# Patient Record
Sex: FEMALE | Race: WHITE | NOT HISPANIC OR LATINO | Employment: UNEMPLOYED
[De-identification: names, ages, dates, MRNs, and addresses within clinical notes are randomized per-mention and may not be internally consistent; named-entity substitution may affect disease eponyms.]

---

## 2020-08-31 ENCOUNTER — OFFICE VISIT (OUTPATIENT)
Age: 70
End: 2020-08-31

## 2021-01-12 ENCOUNTER — OFFICE VISIT (OUTPATIENT)
Age: 71
End: 2021-01-12

## 2021-02-09 ENCOUNTER — OFFICE VISIT (OUTPATIENT)
Age: 71
End: 2021-02-09

## 2021-11-30 ENCOUNTER — OFFICE VISIT (OUTPATIENT)
Age: 71
End: 2021-11-30

## 2021-12-21 ENCOUNTER — OFFICE VISIT (OUTPATIENT)
Dept: URBAN - METROPOLITAN AREA CLINIC 68 | Facility: CLINIC | Age: 71
End: 2021-12-21

## 2022-01-06 ENCOUNTER — OFFICE VISIT (OUTPATIENT)
Dept: URBAN - METROPOLITAN AREA SURGERY CENTER 12 | Facility: SURGERY CENTER | Age: 72
End: 2022-01-06

## 2022-01-06 ENCOUNTER — TELEPHONE ENCOUNTER (OUTPATIENT)
Dept: URBAN - METROPOLITAN AREA CLINIC 68 | Facility: CLINIC | Age: 72
End: 2022-01-06

## 2022-06-01 ENCOUNTER — TELEPHONE ENCOUNTER (OUTPATIENT)
Dept: URBAN - METROPOLITAN AREA CLINIC 68 | Facility: CLINIC | Age: 72
End: 2022-06-01

## 2022-06-04 ENCOUNTER — TELEPHONE ENCOUNTER (OUTPATIENT)
Dept: URBAN - METROPOLITAN AREA CLINIC 68 | Facility: CLINIC | Age: 72
End: 2022-06-04

## 2022-06-04 RX ORDER — PANTOPRAZOLE SODIUM 40 MG/1
TABLET, DELAYED RELEASE ORAL DAILY
Qty: 1 | Refills: 0 | OUTPATIENT
Start: 2021-12-21 | End: 2021-12-22

## 2022-06-04 RX ORDER — OMEPRAZOLE 20 MG/1
CAPSULE, DELAYED RELEASE ORAL DAILY
Qty: 90 | Refills: 0 | OUTPATIENT
Start: 2018-12-07 | End: 2019-03-07

## 2022-06-04 RX ORDER — GABAPENTIN 300 MG/1
GABAPENTIN( 300MG ORAL  TWO TIMES DAILY ) INACTIVE -HX ENTRY CAPSULE ORAL
OUTPATIENT
Start: 2020-01-16

## 2022-06-04 RX ORDER — SODIUM SULFATE, POTASSIUM SULFATE, MAGNESIUM SULFATE 17.5; 3.13; 1.6 G/ML; G/ML; G/ML
SOLUTION, CONCENTRATE ORAL AS DIRECTED
Qty: 1 | Refills: 0 | OUTPATIENT
Start: 2018-05-24 | End: 2018-05-25

## 2022-06-05 ENCOUNTER — TELEPHONE ENCOUNTER (OUTPATIENT)
Dept: URBAN - METROPOLITAN AREA CLINIC 68 | Facility: CLINIC | Age: 72
End: 2022-06-05

## 2022-06-05 RX ORDER — NAPROXEN 375 MG/1
NAPROXEN( 375MG ORAL   ) ACTIVE -HX ENTRY TABLET ORAL
Status: ACTIVE | COMMUNITY
Start: 2021-12-21

## 2022-06-05 RX ORDER — METHOTREXATE 2.5 MG/1
METHOTREXATE( 2.5MG ORAL   ) ACTIVE -HX ENTRY TABLET ORAL
Status: ACTIVE | COMMUNITY
Start: 2021-12-21

## 2022-06-05 RX ORDER — CHOLECALCIFEROL (VITAMIN D3) 125 MCG
VITAMIN D3( 5000UNIT ORAL  DAILY ) ACTIVE -HX ENTRY CAPSULE ORAL DAILY
Status: ACTIVE | COMMUNITY
Start: 2020-01-16

## 2022-06-05 RX ORDER — ATORVASTATIN CALCIUM 10 MG/1
ATORVASTATIN CALCIUM( 10MG ORAL   ) ACTIVE -HX ENTRY TABLET, FILM COATED ORAL
Status: ACTIVE | COMMUNITY
Start: 2021-12-21

## 2022-06-05 RX ORDER — HYDROXYCHLOROQUINE SULFATE 200 MG/1
HYDROXYCHLOROQUINE SULFATE( 200MG ORAL   ) ACTIVE -HX ENTRY TABLET, FILM COATED ORAL
Status: ACTIVE | COMMUNITY
Start: 2021-12-21

## 2022-06-05 RX ORDER — GABAPENTIN 100 MG/1
GABAPENTIN( 100MG ORAL   ) ACTIVE -HX ENTRY CAPSULE ORAL
Status: ACTIVE | COMMUNITY
Start: 2021-12-21

## 2022-06-05 RX ORDER — ALENDRONATE SODIUM 35 MG/1
ALENDRONATE SODIUM( 35MG ORAL   ) ACTIVE -HX ENTRY TABLET ORAL
Status: ACTIVE | COMMUNITY
Start: 2021-12-21

## 2022-06-05 RX ORDER — HYDROCHLOROTHIAZIDE 12.5 MG/1
HYDROCHLOROTHIAZIDE( 12.5MG ORAL  DAILY ) ACTIVE -HX ENTRY CAPSULE ORAL DAILY
Status: ACTIVE | COMMUNITY
Start: 2020-01-16

## 2022-06-05 RX ORDER — ATORVASTATIN CALCIUM 10 MG/1
ATORVASTATIN CALCIUM( 10MG ORAL  DAILY ) ACTIVE -HX ENTRY TABLET, FILM COATED ORAL DAILY
Status: ACTIVE | COMMUNITY
Start: 2020-01-16

## 2022-06-05 RX ORDER — DOXYCYCLINE 100 MG/1
DOXYCYCLINE MONOHYDRATE( 100MG ORAL   ) ACTIVE -HX ENTRY TABLET, FILM COATED ORAL
Status: ACTIVE | COMMUNITY
Start: 2021-12-21

## 2022-06-05 RX ORDER — PREDNISONE 5 MG/1
PREDNISONE( 5MG ORAL   ) ACTIVE -HX ENTRY TABLET ORAL
Status: ACTIVE | COMMUNITY
Start: 2021-12-21

## 2022-06-05 RX ORDER — HYDROCHLOROTHIAZIDE 25 MG/1
HYDROCHLOROTHIAZIDE( 25MG ORAL   ) ACTIVE -HX ENTRY TABLET ORAL
Status: ACTIVE | COMMUNITY
Start: 2021-12-21

## 2022-06-05 RX ORDER — METFORMIN HYDROCHLORIDE 500 MG/1
METFORMIN HCL( 500MG ORAL   ) ACTIVE -HX ENTRY TABLET, COATED ORAL
Status: ACTIVE | COMMUNITY
Start: 2021-12-21

## 2022-06-05 RX ORDER — HYDROCORTISONE 25 MG/G
HYDROCORTISONE( 2.5% EXTERNAL   ) ACTIVE -HX ENTRY CREAM TOPICAL
Status: ACTIVE | COMMUNITY
Start: 2021-12-21

## 2022-06-05 RX ORDER — OMEPRAZOLE 20 MG/1
OMEPRAZOLE( 20MG ORAL   ) ACTIVE -HX ENTRY CAPSULE, DELAYED RELEASE ORAL
Status: ACTIVE | COMMUNITY
Start: 2021-12-21

## 2022-06-05 RX ORDER — PREDNISONE 10 MG/1
PREDNISONE( 10MG ORAL   ) ACTIVE -HX ENTRY TABLET ORAL
Status: ACTIVE | COMMUNITY
Start: 2021-12-21

## 2022-06-05 RX ORDER — MINOCYCLINE HYDROCHLORIDE 50 MG/1
MINOCYCLINE HCL( 50MG ORAL   ) ACTIVE -HX ENTRY TABLET, FILM COATED ORAL
Status: ACTIVE | COMMUNITY
Start: 2021-12-21

## 2022-06-05 RX ORDER — ALENDRONATE SODIUM 70 MG/1
ALENDRONATE SODIUM( 70MG ORAL  WEEKLY ) ACTIVE -HX ENTRY TABLET ORAL WEEKLY
Status: ACTIVE | COMMUNITY
Start: 2020-01-16

## 2022-06-05 RX ORDER — LORATADINE 10 MG/1
LORATADINE( 10MG ORAL   ) ACTIVE -HX ENTRY TABLET ORAL
Status: ACTIVE | COMMUNITY
Start: 2021-12-21

## 2022-06-21 ENCOUNTER — OFFICE VISIT (OUTPATIENT)
Dept: URBAN - METROPOLITAN AREA CLINIC 68 | Facility: CLINIC | Age: 72
End: 2022-06-21

## 2022-06-25 ENCOUNTER — TELEPHONE ENCOUNTER (OUTPATIENT)
Age: 72
End: 2022-06-25

## 2022-06-25 RX ORDER — SODIUM SULFATE, POTASSIUM SULFATE, MAGNESIUM SULFATE 17.5; 3.13; 1.6 G/ML; G/ML; G/ML
SOLUTION, CONCENTRATE ORAL AS DIRECTED
Qty: 1 | Refills: 0 | OUTPATIENT
Start: 2018-05-24 | End: 2018-05-25

## 2022-06-25 RX ORDER — GABAPENTIN 300 MG/1
GABAPENTIN( 300MG ORAL  TWO TIMES DAILY ) INACTIVE -HX ENTRY CAPSULE ORAL
OUTPATIENT
Start: 2020-01-16

## 2022-06-25 RX ORDER — PANTOPRAZOLE 40 MG/1
TABLET, DELAYED RELEASE ORAL DAILY
Qty: 1 | Refills: 0 | OUTPATIENT
Start: 2021-12-21 | End: 2021-12-22

## 2022-06-25 RX ORDER — OMEPRAZOLE 20 MG/1
CAPSULE, DELAYED RELEASE ORAL DAILY
Qty: 90 | Refills: 0 | OUTPATIENT
Start: 2018-12-07 | End: 2019-03-07

## 2022-06-26 ENCOUNTER — TELEPHONE ENCOUNTER (OUTPATIENT)
Age: 72
End: 2022-06-26

## 2022-06-26 RX ORDER — HYDROCHLOROTHIAZIDE 12.5 MG/1
HYDROCHLOROTHIAZIDE( 12.5MG ORAL  DAILY ) ACTIVE -HX ENTRY CAPSULE ORAL DAILY
Status: ACTIVE | COMMUNITY
Start: 2022-06-21

## 2022-06-26 RX ORDER — ALENDRONATE SODIUM 70 MG/1
ALENDRONATE SODIUM( 70MG ORAL  WEEKLY ) ACTIVE -HX ENTRY TABLET ORAL WEEKLY
Status: ACTIVE | COMMUNITY
Start: 2022-06-21

## 2022-06-26 RX ORDER — MINOCYCLINE HYDROCHLORIDE 50 MG/1
MINOCYCLINE HCL( 50MG ORAL   ) ACTIVE -HX ENTRY TABLET ORAL
Status: ACTIVE | COMMUNITY
Start: 2022-06-21

## 2022-06-26 RX ORDER — ATORVASTATIN CALCIUM 10 MG/1
ATORVASTATIN CALCIUM( 10MG ORAL   ) ACTIVE -HX ENTRY TABLET, FILM COATED ORAL
Status: ACTIVE | COMMUNITY
Start: 2022-06-21

## 2022-06-26 RX ORDER — PREDNISONE 10 MG/1
PREDNISONE( 10MG ORAL   ) ACTIVE -HX ENTRY TABLET ORAL
Status: ACTIVE | COMMUNITY
Start: 2022-06-21

## 2022-06-26 RX ORDER — METHOTREXATE 2.5 MG/1
METHOTREXATE( 2.5MG ORAL   ) ACTIVE -HX ENTRY TABLET ORAL
Status: ACTIVE | COMMUNITY
Start: 2022-06-21

## 2022-06-26 RX ORDER — OMEPRAZOLE 20 MG/1
OMEPRAZOLE( 20MG ORAL   ) ACTIVE -HX ENTRY CAPSULE, DELAYED RELEASE ORAL
Status: ACTIVE | COMMUNITY
Start: 2022-06-21

## 2022-06-26 RX ORDER — HYDROCHLOROTHIAZIDE 25 MG/1
HYDROCHLOROTHIAZIDE( 25MG ORAL   ) ACTIVE -HX ENTRY TABLET ORAL
Status: ACTIVE | COMMUNITY
Start: 2022-06-21

## 2022-06-26 RX ORDER — PREDNISONE 5 MG/1
PREDNISONE( 5MG ORAL   ) ACTIVE -HX ENTRY TABLET ORAL
Status: ACTIVE | COMMUNITY
Start: 2022-06-21

## 2022-06-26 RX ORDER — ATORVASTATIN CALCIUM 10 MG/1
ATORVASTATIN CALCIUM( 10MG ORAL  DAILY ) ACTIVE -HX ENTRY TABLET, FILM COATED ORAL DAILY
Status: ACTIVE | COMMUNITY
Start: 2020-01-16

## 2022-06-26 RX ORDER — NAPROXEN 375 MG/1
NAPROXEN( 375MG ORAL   ) ACTIVE -HX ENTRY TABLET ORAL
Status: ACTIVE | COMMUNITY
Start: 2022-06-21

## 2022-06-26 RX ORDER — DEXTROSE 4 G
LORATADINE( 10MG ORAL   ) ACTIVE -HX ENTRY TABLET,CHEWABLE ORAL
Status: ACTIVE | COMMUNITY
Start: 2022-06-21

## 2022-06-26 RX ORDER — METFORMIN HCL 500 MG/1
METFORMIN HCL( 500MG ORAL   ) ACTIVE -HX ENTRY TABLET ORAL
Status: ACTIVE | COMMUNITY
Start: 2022-06-21

## 2022-06-26 RX ORDER — DOXYCYCLINE 100 MG/1
DOXYCYCLINE MONOHYDRATE( 100MG ORAL   ) ACTIVE -HX ENTRY TABLET, FILM COATED ORAL
Status: ACTIVE | COMMUNITY
Start: 2022-06-21

## 2022-06-26 RX ORDER — OMEGA-3/DHA/EPA/FISH OIL 1000 MG
FISH OIL( 1000MG ORAL   ) ACTIVE -HX ENTRY CAPSULE ORAL
Status: ACTIVE | COMMUNITY
Start: 2022-06-21

## 2022-06-26 RX ORDER — ALENDRONATE SODIUM 35 MG/1
ALENDRONATE SODIUM( 35MG ORAL   ) ACTIVE -HX ENTRY TABLET ORAL
Status: ACTIVE | COMMUNITY
Start: 2022-06-21

## 2022-06-26 RX ORDER — HYDROXYCHLOROQUINE SULFATE 200 MG/1
HYDROXYCHLOROQUINE SULFATE( 200MG ORAL   ) ACTIVE -HX ENTRY TABLET, FILM COATED ORAL
Status: ACTIVE | COMMUNITY
Start: 2022-06-21

## 2022-06-26 RX ORDER — SPIRONOLACT/HYDROCHLOROTHIAZID 25 MG-25MG
GLUCOSAMINE-CHONDROITIN( 250-500MG ORAL   ) ACTIVE -HX ENTRY TABLET ORAL
Status: ACTIVE | COMMUNITY
Start: 2022-06-21

## 2022-06-26 RX ORDER — HYDROCORTISONE 25 MG/G
HYDROCORTISONE( 2.5% EXTERNAL   ) ACTIVE -HX ENTRY CREAM TOPICAL
Status: ACTIVE | COMMUNITY
Start: 2022-06-21

## 2022-06-26 RX ORDER — GABAPENTIN 100 MG/1
GABAPENTIN( 100MG ORAL   ) ACTIVE -HX ENTRY CAPSULE ORAL
Status: ACTIVE | COMMUNITY
Start: 2022-06-21

## 2022-06-26 RX ORDER — CHOLECALCIFEROL (VITAMIN D3) 125 MCG
VITAMIN D3( 5000UNIT ORAL  DAILY ) ACTIVE -HX ENTRY CAPSULE ORAL DAILY
Status: ACTIVE | COMMUNITY
Start: 2022-06-21

## 2022-07-08 ENCOUNTER — OFFICE VISIT (OUTPATIENT)
Dept: URBAN - METROPOLITAN AREA CLINIC 68 | Facility: CLINIC | Age: 72
End: 2022-07-08

## 2022-07-08 RX ORDER — ALENDRONATE SODIUM 35 MG/1
ALENDRONATE SODIUM( 35MG ORAL   ) ACTIVE -HX ENTRY TABLET ORAL
Status: ACTIVE | COMMUNITY
Start: 2021-12-21

## 2022-07-08 RX ORDER — HYDROCHLOROTHIAZIDE 25 MG/1
HYDROCHLOROTHIAZIDE( 25MG ORAL   ) ACTIVE -HX ENTRY TABLET ORAL
Status: ACTIVE | COMMUNITY
Start: 2021-12-21

## 2022-07-08 RX ORDER — CHOLECALCIFEROL (VITAMIN D3) 125 MCG
VITAMIN D3( 5000UNIT ORAL  DAILY ) ACTIVE -HX ENTRY CAPSULE ORAL DAILY
Status: ACTIVE | COMMUNITY
Start: 2020-01-16

## 2022-07-08 RX ORDER — HYDROCORTISONE 25 MG/G
HYDROCORTISONE( 2.5% EXTERNAL   ) ACTIVE -HX ENTRY CREAM TOPICAL
Status: ACTIVE | COMMUNITY
Start: 2021-12-21

## 2022-07-08 RX ORDER — MINOCYCLINE HYDROCHLORIDE 50 MG/1
MINOCYCLINE HCL( 50MG ORAL   ) ACTIVE -HX ENTRY TABLET, FILM COATED ORAL
Status: ACTIVE | COMMUNITY
Start: 2021-12-21

## 2022-07-08 RX ORDER — HYDROXYCHLOROQUINE SULFATE 200 MG/1
HYDROXYCHLOROQUINE SULFATE( 200MG ORAL   ) ACTIVE -HX ENTRY TABLET, FILM COATED ORAL
Status: ACTIVE | COMMUNITY
Start: 2021-12-21

## 2022-07-08 RX ORDER — METFORMIN HYDROCHLORIDE 500 MG/1
METFORMIN HCL( 500MG ORAL   ) ACTIVE -HX ENTRY TABLET, COATED ORAL
Status: ACTIVE | COMMUNITY
Start: 2021-12-21

## 2022-07-08 RX ORDER — NAPROXEN 375 MG/1
NAPROXEN( 375MG ORAL   ) ACTIVE -HX ENTRY TABLET ORAL
Status: ACTIVE | COMMUNITY
Start: 2021-12-21

## 2022-07-08 RX ORDER — ATORVASTATIN CALCIUM 10 MG/1
ATORVASTATIN CALCIUM( 10MG ORAL   ) ACTIVE -HX ENTRY TABLET, FILM COATED ORAL
Status: ACTIVE | COMMUNITY
Start: 2021-12-21

## 2022-07-08 RX ORDER — LORATADINE 10 MG/1
LORATADINE( 10MG ORAL   ) ACTIVE -HX ENTRY TABLET ORAL
Status: ACTIVE | COMMUNITY
Start: 2021-12-21

## 2022-07-08 RX ORDER — DOXYCYCLINE 100 MG/1
DOXYCYCLINE MONOHYDRATE( 100MG ORAL   ) ACTIVE -HX ENTRY TABLET, FILM COATED ORAL
Status: ACTIVE | COMMUNITY
Start: 2021-12-21

## 2022-07-08 RX ORDER — GABAPENTIN 100 MG/1
GABAPENTIN( 100MG ORAL   ) ACTIVE -HX ENTRY CAPSULE ORAL
Status: ACTIVE | COMMUNITY
Start: 2021-12-21

## 2022-07-08 RX ORDER — PREDNISONE 5 MG/1
PREDNISONE( 5MG ORAL   ) ACTIVE -HX ENTRY TABLET ORAL
Status: ACTIVE | COMMUNITY
Start: 2021-12-21

## 2022-07-08 RX ORDER — HYDROCHLOROTHIAZIDE 12.5 MG/1
HYDROCHLOROTHIAZIDE( 12.5MG ORAL  DAILY ) ACTIVE -HX ENTRY CAPSULE ORAL DAILY
Status: ACTIVE | COMMUNITY
Start: 2020-01-16

## 2022-07-08 RX ORDER — OMEPRAZOLE 20 MG/1
OMEPRAZOLE( 20MG ORAL   ) ACTIVE -HX ENTRY CAPSULE, DELAYED RELEASE ORAL
Status: ACTIVE | COMMUNITY
Start: 2021-12-21

## 2022-07-08 RX ORDER — ALENDRONATE SODIUM 70 MG/1
ALENDRONATE SODIUM( 70MG ORAL  WEEKLY ) ACTIVE -HX ENTRY TABLET ORAL WEEKLY
Status: ACTIVE | COMMUNITY
Start: 2020-01-16

## 2022-07-08 RX ORDER — METHOTREXATE 2.5 MG/1
METHOTREXATE( 2.5MG ORAL   ) ACTIVE -HX ENTRY TABLET ORAL
Status: ACTIVE | COMMUNITY
Start: 2021-12-21

## 2022-07-08 RX ORDER — PREDNISONE 10 MG/1
PREDNISONE( 10MG ORAL   ) ACTIVE -HX ENTRY TABLET ORAL
Status: ACTIVE | COMMUNITY
Start: 2021-12-21

## 2022-07-12 ENCOUNTER — OFFICE VISIT (OUTPATIENT)
Dept: URBAN - METROPOLITAN AREA CLINIC 68 | Facility: CLINIC | Age: 72
End: 2022-07-12

## 2022-07-14 ENCOUNTER — TELEPHONE ENCOUNTER (OUTPATIENT)
Dept: URBAN - METROPOLITAN AREA CLINIC 68 | Facility: CLINIC | Age: 72
End: 2022-07-14

## 2022-09-09 ENCOUNTER — DASHBOARD ENCOUNTERS (OUTPATIENT)
Age: 72
End: 2022-09-09

## 2022-09-09 ENCOUNTER — OFFICE VISIT (OUTPATIENT)
Dept: URBAN - METROPOLITAN AREA CLINIC 68 | Facility: CLINIC | Age: 72
End: 2022-09-09

## 2022-09-09 RX ORDER — PREDNISONE 10 MG/1
PREDNISONE( 10MG ORAL   ) ACTIVE -HX ENTRY TABLET ORAL
Status: ACTIVE | COMMUNITY
Start: 2021-12-21

## 2022-09-09 RX ORDER — OMEPRAZOLE 20 MG/1
OMEPRAZOLE( 20MG ORAL   ) ACTIVE -HX ENTRY CAPSULE, DELAYED RELEASE ORAL
Status: ACTIVE | COMMUNITY
Start: 2021-12-21

## 2022-09-09 RX ORDER — HYDROCHLOROTHIAZIDE 25 MG/1
HYDROCHLOROTHIAZIDE( 25MG ORAL   ) ACTIVE -HX ENTRY TABLET ORAL
Status: ACTIVE | COMMUNITY
Start: 2021-12-21

## 2022-09-09 RX ORDER — METFORMIN HYDROCHLORIDE 500 MG/1
METFORMIN HCL( 500MG ORAL   ) ACTIVE -HX ENTRY TABLET, COATED ORAL
Status: ACTIVE | COMMUNITY
Start: 2021-12-21

## 2022-09-09 RX ORDER — ALENDRONATE SODIUM 35 MG/1
ALENDRONATE SODIUM( 35MG ORAL   ) ACTIVE -HX ENTRY TABLET ORAL
Status: ACTIVE | COMMUNITY
Start: 2021-12-21

## 2022-09-09 RX ORDER — ATORVASTATIN CALCIUM 10 MG/1
ATORVASTATIN CALCIUM( 10MG ORAL   ) ACTIVE -HX ENTRY TABLET, FILM COATED ORAL
Status: ACTIVE | COMMUNITY
Start: 2021-12-21

## 2022-09-09 RX ORDER — DOXYCYCLINE 100 MG/1
DOXYCYCLINE MONOHYDRATE( 100MG ORAL   ) ACTIVE -HX ENTRY TABLET, FILM COATED ORAL
Status: ACTIVE | COMMUNITY
Start: 2021-12-21

## 2022-09-09 RX ORDER — METHOTREXATE 2.5 MG/1
METHOTREXATE( 2.5MG ORAL   ) ACTIVE -HX ENTRY TABLET ORAL
Status: ACTIVE | COMMUNITY
Start: 2021-12-21

## 2022-09-09 RX ORDER — CHOLECALCIFEROL (VITAMIN D3) 125 MCG
VITAMIN D3( 5000UNIT ORAL  DAILY ) ACTIVE -HX ENTRY CAPSULE ORAL DAILY
Status: ACTIVE | COMMUNITY
Start: 2020-01-16

## 2022-09-09 RX ORDER — LORATADINE 10 MG/1
LORATADINE( 10MG ORAL   ) ACTIVE -HX ENTRY TABLET ORAL
Status: ACTIVE | COMMUNITY
Start: 2021-12-21

## 2022-09-09 RX ORDER — MINOCYCLINE HYDROCHLORIDE 50 MG/1
MINOCYCLINE HCL( 50MG ORAL   ) ACTIVE -HX ENTRY TABLET, FILM COATED ORAL
Status: ACTIVE | COMMUNITY
Start: 2021-12-21

## 2022-09-09 RX ORDER — NAPROXEN 375 MG/1
NAPROXEN( 375MG ORAL   ) ACTIVE -HX ENTRY TABLET ORAL
Status: ACTIVE | COMMUNITY
Start: 2021-12-21

## 2022-09-09 RX ORDER — ALENDRONATE SODIUM 70 MG/1
ALENDRONATE SODIUM( 70MG ORAL  WEEKLY ) ACTIVE -HX ENTRY TABLET ORAL WEEKLY
Status: ACTIVE | COMMUNITY
Start: 2020-01-16

## 2022-09-09 RX ORDER — GABAPENTIN 100 MG/1
GABAPENTIN( 100MG ORAL   ) ACTIVE -HX ENTRY CAPSULE ORAL
Status: ACTIVE | COMMUNITY
Start: 2021-12-21

## 2022-09-09 RX ORDER — HYDROXYCHLOROQUINE SULFATE 200 MG/1
HYDROXYCHLOROQUINE SULFATE( 200MG ORAL   ) ACTIVE -HX ENTRY TABLET, FILM COATED ORAL
Status: ACTIVE | COMMUNITY
Start: 2021-12-21

## 2022-09-09 RX ORDER — PREDNISONE 5 MG/1
PREDNISONE( 5MG ORAL   ) ACTIVE -HX ENTRY TABLET ORAL
Status: ACTIVE | COMMUNITY
Start: 2021-12-21

## 2022-09-09 RX ORDER — HYDROCHLOROTHIAZIDE 12.5 MG/1
HYDROCHLOROTHIAZIDE( 12.5MG ORAL  DAILY ) ACTIVE -HX ENTRY CAPSULE ORAL DAILY
Status: ACTIVE | COMMUNITY
Start: 2020-01-16

## 2022-09-09 RX ORDER — HYDROCORTISONE 25 MG/G
HYDROCORTISONE( 2.5% EXTERNAL   ) ACTIVE -HX ENTRY CREAM TOPICAL
Status: ACTIVE | COMMUNITY
Start: 2021-12-21

## 2022-09-09 NOTE — HPI-MIGRATED HPI
Transition of Care : Patient evaluated due to fatty liver. Had recent FibroScan that showed moderate steatosis and no fibrosis.  Denies nausea, vomits, dysphagia, odynophagia, heartburn, abdominal pain, diarrhea, constipation GI bleeding or weight loss